# Patient Record
Sex: FEMALE | Race: WHITE | URBAN - METROPOLITAN AREA
[De-identification: names, ages, dates, MRNs, and addresses within clinical notes are randomized per-mention and may not be internally consistent; named-entity substitution may affect disease eponyms.]

---

## 2023-08-29 ENCOUNTER — OFFICE VISIT (OUTPATIENT)
Age: 48
End: 2023-08-29
Payer: OTHER GOVERNMENT

## 2023-08-29 VITALS
OXYGEN SATURATION: 99 % | BODY MASS INDEX: 43.98 KG/M2 | HEIGHT: 60 IN | DIASTOLIC BLOOD PRESSURE: 106 MMHG | WEIGHT: 224 LBS | HEART RATE: 83 BPM | TEMPERATURE: 97.9 F | RESPIRATION RATE: 18 BRPM | SYSTOLIC BLOOD PRESSURE: 149 MMHG

## 2023-08-29 DIAGNOSIS — Z71.3 WEIGHT LOSS COUNSELING, ENCOUNTER FOR: ICD-10-CM

## 2023-08-29 DIAGNOSIS — E66.01 MORBID OBESITY (HCC): Primary | ICD-10-CM

## 2023-08-29 DIAGNOSIS — Z71.3 ENCOUNTER FOR DIETARY CONSULTATION: ICD-10-CM

## 2023-08-29 PROCEDURE — 99203 OFFICE O/P NEW LOW 30 MIN: CPT | Performed by: NURSE PRACTITIONER

## 2023-08-29 RX ORDER — DIETHYLPROPION HYDROCHLORIDE 25 MG/1
25 TABLET ORAL 2 TIMES DAILY
Qty: 42 TABLET | Refills: 3 | Status: SHIPPED | OUTPATIENT
Start: 2023-08-29 | End: 2023-11-27

## 2023-08-29 RX ORDER — OMEPRAZOLE 40 MG/1
40 CAPSULE, DELAYED RELEASE ORAL DAILY
COMMUNITY
Start: 2022-03-16

## 2023-08-29 ASSESSMENT — ENCOUNTER SYMPTOMS
EYES NEGATIVE: 1
ALLERGIC/IMMUNOLOGIC NEGATIVE: 1
GASTROINTESTINAL NEGATIVE: 1
RESPIRATORY NEGATIVE: 1

## 2023-08-29 NOTE — PROGRESS NOTES
Diethyproprion   Weight Loss Progress Note: Initial Physician Visit      Lainey Lancaster is a 46 y.o. female with BMI 43.7 who is here for her initial evaluation for the medical weight loss medication program. Patient with history of gastric sleeve in 2016 with a weight loss of over 100lbs getting down to 143lbs, but she has slowly regained the weight back. CC: Morbid Obesity    Weight History  Current weight 224lb   Goal weight 150lbs  Highest weight 224lbs   (See weight gain time line scanned into media section of chart)    Food intolerances (gas, bloating, diarrhea, vomiting)? Tomato based sauces (acid reflux)        Weight loss History  How many weight loss attempts have you had? Gastric Sleeve sx, WW, Noom, appetite suppressants, and strict dieting. Which program were you most successful doing? Gastric sleeve in 2016 and got down to 143lb but has gained 50+lbs back. Significant Medical History    Have you ever taken appetite suppressants? Yes. If yes: Rx or OTC? Phentermine, Wellbutrin   If yes; Any negative side effects? Not effective per pt. Ever diagnosed with sleep apnea or put on CPAP? No.    Ever had bariatric surgery?: Yes. Gastric Sleeve in 2016. Pregnant or planning on becoming pregnant w/in 6 months: No.      Significant Psychosocial History   Any history of drug abuse or dependence: No.  Current Major Lifestyle Changes: None. Any potential unsupportive people: No.  Why are you starting a weight loss program now? Patient desires to lose weight to improve her mental health and overall wellbeing. Are you ready? Yes. History of binge eating disorder or anorexia: No.   If yes, are you currently being treated? Social History  Social History     Tobacco Use    Smoking status: Former    Smokeless tobacco: Never   Substance Use Topics    Alcohol use: Not Currently     How many times a week do you eat out? 3 times weekly. Do you drink any EtOH? No.   If so, how much?

## 2023-09-28 ENCOUNTER — OFFICE VISIT (OUTPATIENT)
Age: 48
End: 2023-09-28
Payer: OTHER GOVERNMENT

## 2023-09-28 VITALS
HEART RATE: 79 BPM | WEIGHT: 224 LBS | OXYGEN SATURATION: 99 % | RESPIRATION RATE: 17 BRPM | BODY MASS INDEX: 43.98 KG/M2 | DIASTOLIC BLOOD PRESSURE: 89 MMHG | SYSTOLIC BLOOD PRESSURE: 143 MMHG | HEIGHT: 60 IN | TEMPERATURE: 97.8 F

## 2023-09-28 DIAGNOSIS — E66.01 MORBID OBESITY (HCC): Primary | ICD-10-CM

## 2023-09-28 DIAGNOSIS — Z71.3 ENCOUNTER FOR DIETARY CONSULTATION: ICD-10-CM

## 2023-09-28 DIAGNOSIS — Z71.3 WEIGHT LOSS COUNSELING, ENCOUNTER FOR: ICD-10-CM

## 2023-09-28 PROCEDURE — 99213 OFFICE O/P EST LOW 20 MIN: CPT | Performed by: NURSE PRACTITIONER

## 2023-09-28 ASSESSMENT — ENCOUNTER SYMPTOMS
EYES NEGATIVE: 1
RESPIRATORY NEGATIVE: 1

## 2023-09-28 NOTE — PROGRESS NOTES
[facility] Progress Note:   F/up Provider Visit    Chief Complaint   Patient presents with    Follow-up     Medical Weight Loss - Diethylpropion        Nestor London is a 46 y.o. female who is here for her  f/up medical weight loss visit for the medication program. Patient did not gain or lose this month.    0 lbs lost/gained. Arm Circumference: 13.5in  Waist Circumference: 38in  Thigh Circumference: 25in  Percent Body Fat: 46.9%      Progress and challenges thus far. Patient very frustrated as she states that she doesn't understand what the Diethylproprion is supposed to do because she did not feel any different while taking it. Advised that it is supposed to suppress appetite as well as cravings to affect weight loss. She is extremely discouraged and stressed due to home dynamics and this is likely causing a severely elevated cortisol level which is inhibiting any weight loss. Discussed with patient that I feel that she could benefit from one of the therapy groups so will make referral.      Diet? Low carb/sugar/starch, high protein/vegetable. Did you have any problems adhering to the program last week? No.  If yes, please explain:     Since your last visit, have you experienced any complications? Patient feels as though Diethylpropion was not effective for her with appetite suppression. Have you received any other medical care this week? No.  If yes, where and for what? Have you had any change in your medications since your last visit? No.  If yes what? Are you taking an appetite suppressant? Diethylpropion. If yes:  Do you need a refill? BP Readings from Last 3 Encounters:   09/28/23 (!) 143/89   08/29/23 (!) 149/106        Eating Habits Over Last Week:  Did you take in 64 oz of non-caloric fluids? Yes. Did you consume your prescribed meal replacement regimen each day? Yes.       Physical Activity Over the Past Week:    Aerobic exercise: 0 min  Resistance exercise: 0